# Patient Record
Sex: MALE | Race: WHITE | NOT HISPANIC OR LATINO | URBAN - METROPOLITAN AREA
[De-identification: names, ages, dates, MRNs, and addresses within clinical notes are randomized per-mention and may not be internally consistent; named-entity substitution may affect disease eponyms.]

---

## 2017-05-03 ENCOUNTER — EMERGENCY (EMERGENCY)
Facility: HOSPITAL | Age: 57
LOS: 1 days | Discharge: HOME | End: 2017-05-03
Attending: EMERGENCY MEDICINE | Admitting: EMERGENCY MEDICINE
Payer: SELF-PAY

## 2017-05-03 VITALS
WEIGHT: 199.96 LBS | DIASTOLIC BLOOD PRESSURE: 84 MMHG | TEMPERATURE: 98 F | OXYGEN SATURATION: 97 % | HEIGHT: 68 IN | SYSTOLIC BLOOD PRESSURE: 134 MMHG | HEART RATE: 71 BPM | RESPIRATION RATE: 18 BRPM

## 2017-05-03 DIAGNOSIS — R10.31 RIGHT LOWER QUADRANT PAIN: ICD-10-CM

## 2017-05-03 DIAGNOSIS — Z88.5 ALLERGY STATUS TO NARCOTIC AGENT: ICD-10-CM

## 2017-05-03 DIAGNOSIS — R10.32 LEFT LOWER QUADRANT PAIN: ICD-10-CM
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
## 2017-05-03 PROCEDURE — 99283 EMERGENCY DEPT VISIT LOW MDM: CPT

## 2017-05-03 PROCEDURE — 99282 EMERGENCY DEPT VISIT SF MDM: CPT

## 2017-05-03 RX ORDER — IBUPROFEN 200 MG
1 TABLET ORAL
Qty: 15 | Refills: 0 | OUTPATIENT
Start: 2017-05-03 | End: 2017-05-08

## 2017-05-03 NOTE — ED STATDOCS - OBJECTIVE STATEMENT
57 y/o male presents to ED c/o bilateral groin pain (R>L) and swelling s/p heavy lifting yesterday while at work. Pt reports that he was lifting pumps when he felt a tearing sensation to bilateral groin areas. He notes +abd discomfort last night, though denies n/v. Pt reports h/o right inguinal hernia repair as a teenager. NKDA. No daily medications. No PMHx. No further complaints at this time.

## 2017-05-03 NOTE — ED STATDOCS - DETAILS:
I, Yeni Lawrence, personally performed the services described in the documentation, reviewed the documentation recorded by the scribe in my presence and it accurately and completely records my words and action.

## 2017-05-03 NOTE — ED STATDOCS - MEDICAL DECISION MAKING DETAILS
Pt most likely with groin muscle strain. Pt advised to look for signs and symptoms of hernia and return to ED immediately if hernia pops out and cannot be reduced. Will Rx Motrin and d/c home.

## 2017-05-03 NOTE — ED STATDOCS - MUSCULOSKELETAL, MLM
+TTP right groin muscles. No hernia felt. Able to straight leg raise, but with pain to right groin. No left groin TTP.

## 2017-05-03 NOTE — ED STATDOCS - NS ED MD SCRIBE ATTENDING SCRIBE SECTIONS
REVIEW OF SYSTEMS/HIV/PAST MEDICAL/SURGICAL/SOCIAL HISTORY/DISPOSITION/VITAL SIGNS( Pullset)/HISTORY OF PRESENT ILLNESS/PHYSICAL EXAM

## 2017-07-18 ENCOUNTER — EMERGENCY (EMERGENCY)
Facility: HOSPITAL | Age: 57
LOS: 1 days | Discharge: HOME | End: 2017-07-18
Attending: EMERGENCY MEDICINE
Payer: SELF-PAY

## 2017-07-18 VITALS
DIASTOLIC BLOOD PRESSURE: 79 MMHG | TEMPERATURE: 100 F | HEART RATE: 96 BPM | SYSTOLIC BLOOD PRESSURE: 135 MMHG | OXYGEN SATURATION: 94 % | RESPIRATION RATE: 20 BRPM | WEIGHT: 190.04 LBS | HEIGHT: 68 IN

## 2017-07-18 PROCEDURE — 93010 ELECTROCARDIOGRAM REPORT: CPT

## 2017-07-18 PROCEDURE — 99284 EMERGENCY DEPT VISIT MOD MDM: CPT

## 2017-07-18 RX ORDER — SODIUM CHLORIDE 9 MG/ML
3 INJECTION INTRAMUSCULAR; INTRAVENOUS; SUBCUTANEOUS ONCE
Qty: 0 | Refills: 0 | Status: DISCONTINUED | OUTPATIENT
Start: 2017-07-18 | End: 2017-07-18

## 2017-07-18 RX ORDER — OXYCODONE AND ACETAMINOPHEN 5; 325 MG/1; MG/1
1 TABLET ORAL ONCE
Qty: 0 | Refills: 0 | Status: DISCONTINUED | OUTPATIENT
Start: 2017-07-18 | End: 2017-07-18

## 2017-07-18 NOTE — ED ADULT NURSE NOTE - OBJECTIVE STATEMENT
Pt c/o of 9/10 aching rib pain left side, reports falling a few days ago at work and landing on left side onto medal bars on a dumpster, reports taking Aleve with partial relief and gradually feeling better throughout the weekend, Reports pulling medal pipes at work today and hearing a "pop" and re-injury same site. Pt denies significant medical hx, denies daily medication use. Patient denies chest pain, tenderness noted to left side. not abrasion, bruising, obvious deformity noted.

## 2017-07-18 NOTE — ED ADULT TRIAGE NOTE - CHIEF COMPLAINT QUOTE
last week I fell off a pipe onto a roll off chest today I was working more pain under lt arm into chest   increased pain  on movement when I cough

## 2017-07-19 ENCOUNTER — EMERGENCY (EMERGENCY)
Facility: HOSPITAL | Age: 57
LOS: 1 days | Discharge: LEFT BEFORE DISCHARGE | End: 2017-07-19

## 2017-07-19 VITALS — WEIGHT: 190.04 LBS | HEIGHT: 68 IN

## 2017-07-19 VITALS
SYSTOLIC BLOOD PRESSURE: 113 MMHG | RESPIRATION RATE: 18 BRPM | DIASTOLIC BLOOD PRESSURE: 70 MMHG | HEART RATE: 70 BPM | OXYGEN SATURATION: 100 % | TEMPERATURE: 98 F

## 2017-07-19 VITALS
OXYGEN SATURATION: 97 % | RESPIRATION RATE: 20 BRPM | SYSTOLIC BLOOD PRESSURE: 148 MMHG | DIASTOLIC BLOOD PRESSURE: 91 MMHG | HEART RATE: 74 BPM | TEMPERATURE: 99 F

## 2017-07-19 DIAGNOSIS — R07.89 OTHER CHEST PAIN: ICD-10-CM

## 2017-07-19 PROCEDURE — 99283 EMERGENCY DEPT VISIT LOW MDM: CPT | Mod: 25

## 2017-07-19 PROCEDURE — 71101 X-RAY EXAM UNILAT RIBS/CHEST: CPT | Mod: 26

## 2017-07-19 PROCEDURE — 93005 ELECTROCARDIOGRAM TRACING: CPT

## 2017-07-19 PROCEDURE — 71101 X-RAY EXAM UNILAT RIBS/CHEST: CPT

## 2017-07-19 RX ORDER — OXYCODONE HYDROCHLORIDE 5 MG/1
1 TABLET ORAL
Qty: 12 | Refills: 0 | OUTPATIENT
Start: 2017-07-19 | End: 2017-07-22

## 2017-07-19 RX ADMIN — OXYCODONE AND ACETAMINOPHEN 1 TABLET(S): 5; 325 TABLET ORAL at 23:50

## 2017-07-19 NOTE — ED ADULT NURSE REASSESSMENT NOTE - NS ED NURSE REASSESS COMMENT FT1
Discharge instructions reviewed with pt, including medication purpose, dose, frequency and s/e. Pt able to clarify using teach back method, pt verbalized understanding of discharge instructions and agrees with plan, Pt discharged A&Ox3 at baseline, VSS, denied pain.

## 2017-07-19 NOTE — ED PROVIDER NOTE - MEDICAL DECISION MAKING DETAILS
A 56 year old male pt presents to the ED c/o CP secondary to fall. Will order x-ray rib series, treat pain, and re-evaluate.

## 2017-07-19 NOTE — ED ADULT NURSE REASSESSMENT NOTE - NS ED NURSE REASSESS COMMENT FT1
Pt resting comfortably on stretcher, offer no complaints at this time, POC discussed awaiting dispo, safety and comfort measures in place.

## 2017-07-19 NOTE — ED PROVIDER NOTE - OBJECTIVE STATEMENT
A 56 year old male pt presents to the ED c/o CP. 4 days ago The pt was standing on top of a dumpster when he fell off, landing on the left side of his axillary region. Today, while at work the pt felt a tear at the area of the injury. Pt states that the pain is a sharp pain that is worse with movement, breathing, and radiates to the front of his chest. He has taken Aleve for the pain but states that it has not helped. Pt denies any blood in his stool or hematuria. No further complaints at this time.

## 2018-02-05 ENCOUNTER — EMERGENCY (EMERGENCY)
Facility: HOSPITAL | Age: 58
LOS: 1 days | Discharge: HOME | End: 2018-02-05
Attending: EMERGENCY MEDICINE
Payer: COMMERCIAL

## 2018-02-05 VITALS
OXYGEN SATURATION: 97 % | TEMPERATURE: 98 F | RESPIRATION RATE: 20 BRPM | HEART RATE: 78 BPM | DIASTOLIC BLOOD PRESSURE: 94 MMHG | HEIGHT: 68 IN | WEIGHT: 199.96 LBS | SYSTOLIC BLOOD PRESSURE: 142 MMHG

## 2018-02-05 DIAGNOSIS — Z98.890 OTHER SPECIFIED POSTPROCEDURAL STATES: Chronic | ICD-10-CM

## 2018-02-05 PROCEDURE — 73110 X-RAY EXAM OF WRIST: CPT | Mod: 26,RT

## 2018-02-05 PROCEDURE — 29125 APPL SHORT ARM SPLINT STATIC: CPT | Mod: RT

## 2018-02-05 PROCEDURE — 73030 X-RAY EXAM OF SHOULDER: CPT | Mod: 26,RT

## 2018-02-05 PROCEDURE — 73110 X-RAY EXAM OF WRIST: CPT

## 2018-02-05 PROCEDURE — 99284 EMERGENCY DEPT VISIT MOD MDM: CPT | Mod: 25

## 2018-02-05 PROCEDURE — 29125 APPL SHORT ARM SPLINT STATIC: CPT

## 2018-02-05 PROCEDURE — 73030 X-RAY EXAM OF SHOULDER: CPT

## 2018-02-05 RX ORDER — IBUPROFEN 200 MG
800 TABLET ORAL ONCE
Qty: 0 | Refills: 0 | Status: COMPLETED | OUTPATIENT
Start: 2018-02-05 | End: 2018-02-05

## 2018-02-05 RX ADMIN — Medication 800 MILLIGRAM(S): at 20:21

## 2018-02-05 NOTE — ED PROVIDER NOTE - OBJECTIVE STATEMENT
58 y/o M pt presents to ED c/o worsening right wrist pain s/p slip and fall 3 days ago 58 y/o M left hand dominant pt with presents to ED c/o worsening right wrist pain s/p slip and fall on ice 3 days ago. Pt states he fell on his right hand on ice, and internally rotated hand and hyperextended thumb. Pt states he has had pain since then and right shoulder pain. He tried to alleviate with ibuprofen with minimal relief. pt states he has shooting, nerve like pain from hand to shoulder. No numbness, weakness, loss of sensation or inability to move. No further complaints at this time.

## 2018-02-05 NOTE — ED ADULT NURSE NOTE - OBJECTIVE STATEMENT
pt reports slip and fall on ice on friday, c/o pain to right hand and right shoulder. reports swelling and right wrist pain.

## 2018-02-05 NOTE — ED PROVIDER NOTE - PROGRESS NOTE DETAILS
PA NOTE: PT seen resting comfortably in no acute distress, no acute complaints at this time, PT tolerating PO intake,  PT informed of all results, pt informed of possibility of change in radiology read after official read, PT will be DC home with thumb spica splint, follow up to ortho, pt educated educated about when to return to the ED if needed. PT verbalizes that he understands all instructions and results.

## 2018-02-05 NOTE — ED PROCEDURE NOTE - NS ED PERI VASCULAR NEG
no paresthesia/capillary refill time < 2 seconds/fingers/toes warm to touch/no cyanosis of extremity/no swelling

## 2018-02-05 NOTE — ED PROCEDURE NOTE - CPROC ED POST PROC CARE GUIDE1
Verbal/written post procedure instructions were given to patient/caregiver./Elevate the injured extremity as instructed./Instructed patient/caregiver to follow-up with primary care physician./Instructed patient/caregiver regarding signs and symptoms of infection./Keep the cast/splint/dressing clean and dry.

## 2018-02-05 NOTE — ED PROVIDER NOTE - ATTENDING CONTRIBUTION TO CARE
c/o wrist pain after falling on ice  slight tenderness over anatomical snuubox  x-rays are negative  but will place insplint due to possibilty of occult navicular fracture  Agree with assessment hx and physical

## 2020-07-10 NOTE — ED ADULT TRIAGE NOTE - CCCP TRG CHIEF CMPLNT
chest soreness No pertinent past medical history <<----- Click to add NO pertinent Past Medical History

## 2021-09-02 NOTE — ED PROVIDER NOTE - GASTROINTESTINAL NEGATIVE STATEMENT, MLM
Ordered exercise stress echo. Left message on voicemail for patient to call back. Also sent mychart message.   no abdominal pain, no bloating, no constipation, no diarrhea, no nausea and no vomiting.

## 2024-03-08 NOTE — ED ADULT NURSE NOTE - DISCHARGE DATE/TIME
20-Jul-2017 00:28 FAMILY HISTORY:  Father  Still living? No  FH: multiple myeloma, Age at diagnosis: Age Unknown    Mother  Still living? No  FHx: breast cancer, Age at diagnosis: Age Unknown  FHx: lung cancer, Age at diagnosis: Age Unknown  FHx: non-Hodgkin's lymphoma, Age at diagnosis: Age Unknown